# Patient Record
Sex: FEMALE | Race: BLACK OR AFRICAN AMERICAN
[De-identification: names, ages, dates, MRNs, and addresses within clinical notes are randomized per-mention and may not be internally consistent; named-entity substitution may affect disease eponyms.]

---

## 2019-06-17 ENCOUNTER — HOSPITAL ENCOUNTER (EMERGENCY)
Dept: HOSPITAL 26 - MED | Age: 19
Discharge: HOME | End: 2019-06-17
Payer: MEDICAID

## 2019-06-17 VITALS — BODY MASS INDEX: 21.07 KG/M2 | WEIGHT: 134.25 LBS | HEIGHT: 67 IN

## 2019-06-17 VITALS — DIASTOLIC BLOOD PRESSURE: 74 MMHG | SYSTOLIC BLOOD PRESSURE: 129 MMHG

## 2019-06-17 VITALS — DIASTOLIC BLOOD PRESSURE: 64 MMHG | SYSTOLIC BLOOD PRESSURE: 118 MMHG
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
## 2019-06-17 DIAGNOSIS — K05.30: ICD-10-CM
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
## 2019-06-17 DIAGNOSIS — S02.5XXA: Primary | ICD-10-CM

## 2019-06-17 DIAGNOSIS — Z79.899: ICD-10-CM

## 2019-06-17 DIAGNOSIS — J45.909: ICD-10-CM

## 2019-06-17 PROCEDURE — 96372 THER/PROPH/DIAG INJ SC/IM: CPT

## 2019-06-17 PROCEDURE — 99283 EMERGENCY DEPT VISIT LOW MDM: CPT

## 2019-06-17 NOTE — NUR
Patient discharged with v/s stable. Written and verbal after care instructions 
given and explained. 

Patient alert, oriented and verbalized understanding of instructions. 
Ambulatory with steady gait. All questions addressed prior to discharge. ID 
band removed. Patient advised to follow up with PMD. Rx of 
AMOXICILLIN/IBUPROFEN/TRAMADOL given. Patient educated on indication of 
medication including possible reaction and side effects. Opportunity to ask 
questions provided and answered.

## 2019-06-17 NOTE — NUR
PT IS A 18 Y/O FEMALE WHO PRESENTS TO THE ED C/O TOOTHACHE. PT STATES THAT PAIN 
HAS BEEN BOTHERING X10 DAYS. PT HAS NOT SEEN DENTIST. PT REPORTS 10/10 ACHING 
TOOTHACHE PAIN THAT DOES NOT RADIATE. PT DENIES CP, SOB, N/V/D. PT AWAKE AND 
ALERT, RR EVEN/UNLABORED. PT REPOSITIONED FOR COMFORT, BED IN LOWEST POSITION. 
ER PROVIDER NOTIFIED. WILL CONTINUE TO MONITOR. 



PM[facility]---ASTHMA

NKA